# Patient Record
Sex: FEMALE | Race: WHITE | Employment: FULL TIME | URBAN - METROPOLITAN AREA
[De-identification: names, ages, dates, MRNs, and addresses within clinical notes are randomized per-mention and may not be internally consistent; named-entity substitution may affect disease eponyms.]

---

## 2022-03-19 PROBLEM — E66.01 OBESITY, MORBID (HCC): Status: ACTIVE | Noted: 2020-07-13

## 2022-03-19 PROBLEM — Z86.39 HISTORY OF HIGH CHOLESTEROL: Status: ACTIVE | Noted: 2020-07-13

## 2022-10-24 NOTE — PROGRESS NOTES
[address] [facility]. [address], [address] [facility]  [phone]    Patient Name:  Heike Cali  YOB: 1970      Office Visit 10/26/2022    CHIEF COMPLAINT:    Chief Complaint   Patient presents with    Sleep Apnea       HISTORY OF PRESENT ILLNESS:      The patient is seen today in follow-up of obstructive sleep apnea. She has a history of moderate obstructive sleep apnea dating back to a study in 2008. At that time she had an AHI of 20 and a CPAP titration revealed the optimal level of pressure to be 10 cmH2O. She is currently being treated with APAP at 5-15 cmH2O with no EPR. Her download over the past 90 days indicates average usage of 8.5 hours per night with 100% efficacy. Her AHI is excellent at 0.3 and the maximum pressure generated on her machine is 12.1 cm water. Her leak is very modest.  She has received a message that the machine is reaching the end of its battery life and will need to be replaced. She is now here to get a new machine and supplies. The patient is using nasal pillows with the tubing connected to the front of her mask. Since she turns frequently in bed, I showed her the ResMed P 30 I model with the tubing connected at the crown of the head. She would like to try this model as it may may improve her comfort with sleep. The patient remains significantly overweight. Her BMI is over 44. She has tried multiple diets in the past without great efficacy. She was enrolled in a diet through her employer which turned out to be a keto diet which was quite effective for her. I will provide her with our Wheat Belly diet handout which has an excellent discussion of foods helpful for weight loss and those which are not. She might also benefit from looking at cookbooks associated with a side to help guide her food choices long-term. The patient's [place] score today is normal at 2/24 consistent with no hypersomnia.     The patient did have blood work earlier this year documenting a very mildly elevated hemoglobin level possibly consistent with polycythemia. Red blood cell indices were normal.  She also had a normal TSH level.     Sleep Medicine 10/26/2022   Sitting and reading 0   Watching TV 0   Sitting, inactive in a public place (e.g. a theatre or a meeting) 0   As a passenger in a car for an hour without a break 0   Lying down to rest in the afternoon when circumstances permit 2   Sitting and talking to someone 0   Sitting quietly after a lunch without alcohol 0   In a car, while stopped for a few minutes in traffic 0   [place] Sleepiness Score 2            CPAP TITRATION 7/7/08       Ref Range & Units 5/2/22 0759   WBC 3.8 - 11.8 TH/mm3 8.9    RBC 3.63 - 4.92 Mil/mm3 5.24 High     Hemoglobin 10.9 - 14.3 g/dL 14.4 High     Hematocrit 31.2 - 41.9 % 41.9    MCV 75.5 - 95.3 fL 79.9    MCH 24.7 - 32.8 pg 27.6    MCHC 32.3 - 35.6 g/dL 34.5    RDW 12.3 - 17.7 % 14.4    Platelets 744 - 057 Th/mm3 277         Ref Range & Units 5/2/22 0759   TSH 0.45 - 5.33 uIU/mL 2.20    Resulting Agency  [facility]         Past Medical History:   Diagnosis Date    Depression     Diabetes ([facility] [address]     Hypercholesterolemia     Hypertension     Thyroid disease          [unfilled]      Past Surgical History:   Procedure Laterality Date    RECTOCELE REPAIR         [unfilled]    Social History     Socioeconomic History    Marital status: Single     Spouse name: Not on file    Number of children: Not on file    Years of education: Not on file    Highest education level: Not on file   Occupational History    Not on file   Tobacco Use    Smoking status: Never    Smokeless tobacco: Never   Substance and Sexual Activity    Alcohol use: Not Currently    Drug use: Never    Sexual activity: Not on file   Other Topics Concern    Not on file   Social History Narrative    Not on file     Social Determinants of Health     Financial Resource Strain: Not on file Food Insecurity: Not on file   Transportation Needs: Not on file   Physical Activity: Not on file   Stress: Not on file   Social Connections: Not on file   Intimate Partner Violence: Not on file   Housing Stability: Not on file         No family history on file. No Known Allergies      Current Outpatient Medications   Medication Sig    buPROPion (WELLBUTRIN XL) 300 MG extended release tablet Take 300 mg by mouth daily    vitamin D3 (CHOLECALCIFEROL) 125 MCG (5000 UT) TABS tablet Take by mouth    RYBELSUS 7 MG TABS TAKE 1 TABLET BY MOUTH EVERY DAY    Loratadine (CLARITIN PO) Take 10 mg by mouth    B Complex-C (SUPER B COMPLEX PO) Take by mouth    Multiple Vitamins-Minerals (HAIR/SKIN/NAILS/BIOTIN PO) Take by mouth    turmeric 500 MG CAPS Take by mouth 2 times daily    fluticasone (FLONASE) 50 MCG/ACT nasal spray 2 sprays by Nasal route daily    levothyroxine (SYNTHROID) 75 MCG tablet Take by mouth every morning (before breakfast)    lisinopril (PRINIVIL;ZESTRIL) 40 MG tablet Take 40 mg by mouth daily    metFORMIN (GLUCOPHAGE) 1000 MG tablet Take 1,000 mg by mouth 2 times daily (with meals)     No current facility-administered medications for this visit. REVIEW OF SYSTEMS:   CONSTITUTIONAL:   There is no history of fever, chills, night sweats, weight loss, weight gain, persistent fatigue, or lethargy/hypersomnolence. EYES:   Denies problems with eye pain, erythema, blurred vision, or visual field loss. ENTM:   Denies history of tinnitus, epistaxis, sore throat, hoarseness, or dysphonia. LYMPH:   Denies swollen glands. CARDIAC:   No chest pain, pressure, discomfort, palpitations, orthopnea, murmurs, or edema. GI:   No dysphagia, heartburn reflux, nausea/vomiting, diarrhea, abdominal pain, or bleeding. :   Denies history of dysuria, hematuria, polyuria, or decreased urine output. MS:   No history of myalgias, arthralgias, bone pain, or muscle cramps.    SKIN:   No history of rashes, jaundice, cyanosis, nodules, or ulcers. ENDO:   Negative for heat or cold intolerance. She does have a history of diabetes mellitus. PSYCH:   Negative for anxiety, depression, insomnia, hallucinations. NEURO:   There is no history of AMS, persistent headache, decreased level of consciousness, seizures, or motor or sensory deficits. PHYSICAL EXAM:    Vitals:    10/26/22 0843   BP: 130/80   Pulse: 80   Resp: 16   SpO2: 98%   BMI                    44.2     GENERAL APPEARANCE:   The patient is overweight and in no respiratory distress. HEENT:   PERRL. Conjunctivae unremarkable. Nasal mucosa is without epistaxis, exudate, or polyps. Gums and dentition are unremarkable. There is moderate oropharyngeal narrowing. NECK/LYMPHATIC:   Symmetrical with no elevation of jugular venous pulsation. Trachea midline. No thyroid enlargement. No cervical adenopathy. LUNGS:   Normal respiratory effort with symmetrical lung expansion. Breath sounds are mildly decreased but clear bilaterally. HEART:   There is a regular rate and rhythm. No murmur, rub, or gallop. There is no edema in the lower extremities. ABDOMEN:   Soft and non-tender. Bowel sounds are normal.     SKIN:   There are no rashes, cyanosis, jaundice, or ecchymosis present. EXTREMITIES:   The extremities are unremarkable without clubbing, cyanosis, joint inflammation, degenerative, or ischemic change. MUSCULOSKELETAL:   There is no abnormal tone, muscle atrophy, or abnormal movement present. NEURO:   The patient is alert and oriented to person, place, and time. Memory appears intact and mood is normal.  No gross sensorimotor deficits are present. ASSESSMENT:  (Medical Decision Making)      ICD-10-CM    1. RIMMA (obstructive sleep apnea)  G47.33 The patient sleep apnea is very well controlled at this time on APAP at 5-15 cm water. No adjustment in her pressure is needed.   She will need a new machine as hers is reaching the end of its battery life. I will change her to a ResMed P 30 I mask since she turns frequently in bed as this may help with comfort. She is clearly benefiting from therapy. 2. Obesity, morbid ([address]  E66.01 This is an ongoing problem. She admits to being a stress eater. She has not done well with keto diets in the past.  I will provide her with our Wheat Belly diet handout which is an excellent low glycemic index diet which may be more beneficial for her. 3. Hypothyroidism, unspecified type  E03.9 The patient's recent TSH was normal.              PLAN:    Continue APAP at 5-15 cmH2O. I will generate a new start order with [facility] and change her to a ResMed P 30 I mask. The patient is inquiring about a travel CPAP machine. I indicated I could order it for her but she would like to hold off on getting a new machine at this time. She will call if he wants us to put in that order. Weight loss through caloric restriction and increase physical activity as able. Follow-up will be in about 4 to 5 months. Orders Placed This Encounter   Procedures    DME - [address] [facility]  Phone: [address]  [employer] [facility] [address]  Dept: [phone]      Patient Name: Carlos San  : 1970  Gender: female  Address: [place] [address] [address] [address]  Patient phone number: [phone] (home)       Primary Insurance: Payor: Hyedi Medeiros / Plan: Wendy Rosenberg / Product Type: *No Product type* /   Subscriber ID: [id number] - ([facility])      [facility] Order  Order Details     DME Location: [facility]   Order Date: 10/26/2022   The primary encounter diagnosis was RIMMA (obstructive sleep apnea). Diagnoses of Obesity, morbid ([address] and Hypothyroidism, unspecified type were also pertinent to this visit.           (  X   )New Set-Up      machine   (     )[id number] CPAP Unit  (  x   ) Auto CPAP Unit  (     ) BiLevel Unit  ( ) Auto BiLevel Unit  (     ) ASV   (     ) Bilevel ST    (     ) Oxygen Concentrator         Length of need: 12 months    Pressure: 5-15 cmH20  EPR: 0     Starting Ramp Pressure:  5 cm H20  Ramp Time: min N/A    Patient had a diagnostic Apnea Hypopnea Index (AHI) of : 20    *SUPPLIES* Replace all as needed, or per coverage guidelines     Masks Type:    (     ) -Full Face Mask (1 per 3 mon)  (     ) -Full Mask (1 per month) Interface/Cushion      (  x   ) -Nasal Mask (1 per 3 mon) <<< Please size and supply Resmed P30i >>>  (     ) - Nasal Mask (1 per month) Interface/Cushion  (  x   ) -Pillow (2 per mon)  (     ) -Lfuembbzx (1 per 6 mon)      _________________________________________________________________          Other Supplies:    (  X   )-Aubjaltm (1 per 6 mon)  ( X    )-Drfbyg Tubing (1 per 3 mon)  (  X   )- Disposable Filter (2 per mon)  (   X  )-Mggxao Humidifier (1 per year)     (  x   )-Kqhomfcja (sometimes used with Full Face Mask) (1 per 6 mos)  (     )-Tubing-without heat (1 per 3 mos)  ( X   )-Non-Disposable Filter (1 per 6 mos)  (   x  )-Water Chamber (1 per 6 mos)  (     )-Humidifier non-heated (1 per 5 yrs)      Signed Date: 10/26/2022  Electronically Signed By: Yesica Oglesby MD        No orders of the defined types were placed in this encounter. Yesica Oglesby MD  Electronically signed    Over 50% of today's office visit was spent in face to face time reviewing test results, prognosis, importance of compliance, education about disease process, benefits of medications, instructions for management of acute flare-ups, and follow up plans. Total face to face time spent with the patient and charting was 40 minutes. Dictated using voice recognition software.   Proof read but unrecognized errors may exist.

## 2022-10-26 ENCOUNTER — OFFICE VISIT (OUTPATIENT)
Dept: SLEEP MEDICINE | Age: 52
End: 2022-10-26
Payer: COMMERCIAL

## 2022-10-26 VITALS
BODY MASS INDEX: 43.91 KG/M2 | OXYGEN SATURATION: 98 % | HEART RATE: 80 BPM | SYSTOLIC BLOOD PRESSURE: 130 MMHG | DIASTOLIC BLOOD PRESSURE: 80 MMHG | WEIGHT: 257.2 LBS | RESPIRATION RATE: 16 BRPM | HEIGHT: 64 IN

## 2022-10-26 DIAGNOSIS — E66.01 OBESITY, MORBID (HCC): ICD-10-CM

## 2022-10-26 DIAGNOSIS — E03.9 HYPOTHYROIDISM, UNSPECIFIED TYPE: ICD-10-CM

## 2022-10-26 DIAGNOSIS — G47.33 OSA (OBSTRUCTIVE SLEEP APNEA): Primary | ICD-10-CM

## 2022-10-26 PROBLEM — E11.9 TYPE 2 DIABETES MELLITUS (HCC): Status: ACTIVE | Noted: 2022-10-26

## 2022-10-26 PROCEDURE — 99204 OFFICE O/P NEW MOD 45 MIN: CPT | Performed by: INTERNAL MEDICINE

## 2022-10-26 RX ORDER — ORAL SEMAGLUTIDE 7 MG/1
TABLET ORAL
COMMUNITY
Start: 2022-10-05

## 2022-10-26 RX ORDER — VIT C/B6/B5/MAGNESIUM/HERB 173 50-5-6-5MG
CAPSULE ORAL 2 TIMES DAILY
COMMUNITY

## 2022-10-26 RX ORDER — BUPROPION HYDROCHLORIDE 300 MG/1
300 TABLET ORAL DAILY
COMMUNITY
Start: 2020-05-26

## 2022-10-26 ASSESSMENT — SLEEP AND FATIGUE QUESTIONNAIRES
HOW LIKELY ARE YOU TO NOD OFF OR FALL ASLEEP WHEN YOU ARE A PASSENGER IN A CAR FOR AN HOUR WITHOUT A BREAK: 0
HOW LIKELY ARE YOU TO NOD OFF OR FALL ASLEEP IN A CAR, WHILE STOPPED FOR A FEW MINUTES IN TRAFFIC: 0
HOW LIKELY ARE YOU TO NOD OFF OR FALL ASLEEP WHILE LYING DOWN TO REST IN THE AFTERNOON WHEN CIRCUMSTANCES PERMIT: 2
HOW LIKELY ARE YOU TO NOD OFF OR FALL ASLEEP WHILE SITTING AND READING: 0
HOW LIKELY ARE YOU TO NOD OFF OR FALL ASLEEP WHILE SITTING QUIETLY AFTER LUNCH WITHOUT ALCOHOL: 0
HOW LIKELY ARE YOU TO NOD OFF OR FALL ASLEEP WHILE WATCHING TV: 0
HOW LIKELY ARE YOU TO NOD OFF OR FALL ASLEEP WHILE SITTING INACTIVE IN A PUBLIC PLACE: 0
ESS TOTAL SCORE: 2
HOW LIKELY ARE YOU TO NOD OFF OR FALL ASLEEP WHILE SITTING AND TALKING TO SOMEONE: 0

## 2022-10-26 NOTE — PATIENT INSTRUCTIONS
especially those that are non-starchy and low in calories. These include things like cruciferous veggies (broccoli or [place] sprouts, for example), leafy greens, peppers, mushrooms, asparagus, artichoke, etc.   Fresh fruit (but not processed juices), including berries, apples, melon, and citrus fruits like grapefruit or oranges. Some people prefer to eat mostly low-sugar fruits but avoid those higher in sugar like pineapple, papaya, juan or banana. Healthy fats like coconut oil or olive oil, raw nuts and seeds, avocado, coconut milk, olives, cocoa butter, and grass-fed butter or ghee. Grass-fed, humanely raised meat and eggs, plus wild-caught fish. Full-fat cheeses (ideally made from raw, organic milk). Fermented foods like unsweetened kefir or yogurt, pickled or cultured vegetables, and in moderation tofu, tempeh, miso and natto. If theyre well-tolerated, unprocessed grains in moderation, including quinoa, millet, buckwheat (not actually a type of wheat), brown rice and amaranth. Worst Wheat Belly Foods to Avoid:    Eating a wheat belly diet means avoiding anything made with the grains wheat, barley, rye, spelt or certain oats. Additionally, Ojus Batter recommends avoiding added sugar, condiments that include synthetic or chemically altered ingredients, sugary drinks and other processed foods as much as possible. Below are the main foods to exclude from your diet if you choose to try following this dietary plan:  Grain-based desserts, including both packaged or homemade cakes, cookies, donuts, pies, crisps, cobblers, and granola bars   Breads, especially those made with refined wheat flour. Even many gluten-free breads or packaged products should not contribute many calories to your diet.  While products made from grains other than wheat (like corn or rice) might be free of gluten, theyre still usually not very nutrient-dense and are inferior to eating whole, sprouted ancient grains like oats, quinoa, wild rice or teff, for example. Plus, modern food-processing techniques usually contaminate these foods with gluten since theyre processed using the same equipment that wheat is. Most cereals   Pizza   Pasta and noodles   Chips and crackers   Wheat tortillas, wraps, burritos and tacos   Fast food   Take-out, including most [place] or [place] dishes, burgers and deli sandwiches   Breaded proteins like chicken cutlets, processed meats, hot dogs and frozen veggie burgers   Added sugar, including high fructose corn syrup, sucrose, dried fruit, juices and sugary beverage   Processed rice and potato products   Trans fats, fried foods and cured meats  Tips for Giving Up Wheat and Processed Grains:  When grocery shopping, check ingredients carefully and look for products made without wheat, rye and barley. This might mean choosing certified gluten-free items in some cases, although even these can be highly processed. The most substantial sources of wheat in your diet are likely bread or baked products made with wheat flour (like pizza, pasta at restaurants, bread, etc.), so unless specifically noted that these are grain- or gluten-free, assume they contain wheat. If you are going to buy bread, look for sourdough or sprouted grain breads (like Yasir bread), which are usually better tolerated than ordinary wheat-flour breads. When it comes to baking or using flour in recipes, try some of these naturally gluten-free flour alternatives over wheat flour: brown rice, quinoa, chickpea, almond and coconut flour. Remember that wheat is hiding in many condiments, sauces, dressings, etc. Avoid any that contain flour or added sugar, sticking with basic condiments or flavor enhancers like vinegar, herbs, spices and real bone broth. Many types of alcohol, including beer, also contain wheat.  Hard liquor and wine are better options, however watch the amount you consume and what you mix these

## 2022-11-02 ENCOUNTER — TELEPHONE (OUTPATIENT)
Dept: SLEEP MEDICINE | Age: 52
End: 2022-11-02

## 2022-11-02 NOTE — TELEPHONE ENCOUNTER
Patient was in last week. Does not want to use [facility]. Patient wants order sent to [facility]    [phone]

## 2024-03-28 ENCOUNTER — APPOINTMENT (RX ONLY)
Age: 54
Setting detail: DERMATOLOGY
End: 2024-03-28

## 2024-03-28 VITALS — DIASTOLIC BLOOD PRESSURE: 88 MMHG | SYSTOLIC BLOOD PRESSURE: 138 MMHG

## 2024-03-28 DIAGNOSIS — L64.8 OTHER ANDROGENIC ALOPECIA: ICD-10-CM

## 2024-03-28 DIAGNOSIS — L81.4 OTHER MELANIN HYPERPIGMENTATION: ICD-10-CM

## 2024-03-28 PROCEDURE — 99204 OFFICE O/P NEW MOD 45 MIN: CPT

## 2024-03-28 PROCEDURE — ? PHOTO-DOCUMENTATION

## 2024-03-28 PROCEDURE — ? PRESCRIPTION MEDICATION MANAGEMENT

## 2024-03-28 PROCEDURE — ? COUNSELING

## 2024-03-28 ASSESSMENT — LOCATION DETAILED DESCRIPTION DERM
LOCATION DETAILED: RIGHT SUPERIOR PARIETAL SCALP
LOCATION DETAILED: RIGHT CENTRAL MALAR CHEEK
LOCATION DETAILED: LEFT INFERIOR CENTRAL MALAR CHEEK

## 2024-03-28 ASSESSMENT — LOCATION ZONE DERM
LOCATION ZONE: FACE
LOCATION ZONE: SCALP

## 2024-03-28 ASSESSMENT — LOCATION SIMPLE DESCRIPTION DERM
LOCATION SIMPLE: RIGHT CHEEK
LOCATION SIMPLE: LEFT CHEEK
LOCATION SIMPLE: SCALP

## 2024-03-28 NOTE — PROCEDURE: PRESCRIPTION MEDICATION MANAGEMENT
Continue Regimen: - Minoxidil 2.5 mg- take one tablet daily (previously prescribed)
Detail Level: Zone
Render In Strict Bullet Format?: No

## 2024-03-28 NOTE — HPI: OTHER
Condition:: skin lesions
Please Describe Your Condition:: is a new patient who is being seen for a chief complaint of skin lesions. located on the face for over 8 years, have not changed in size, but are darker in pigmentation. She believes the brown spots are hereditary.

## 2024-06-19 ENCOUNTER — RX ONLY (OUTPATIENT)
Age: 54
Setting detail: RX ONLY
End: 2024-06-19

## 2024-06-19 RX ORDER — MINOXIDIL 2.5 MG/1
TABLET ORAL
Qty: 90 | Refills: 1 | Status: ERX | COMMUNITY
Start: 2024-06-19

## 2025-07-01 ENCOUNTER — TELEPHONE (OUTPATIENT)
Dept: INTERNAL MEDICINE CLINIC | Facility: CLINIC | Age: 55
End: 2025-07-01

## 2025-07-22 SDOH — HEALTH STABILITY: PHYSICAL HEALTH: ON AVERAGE, HOW MANY DAYS PER WEEK DO YOU ENGAGE IN MODERATE TO STRENUOUS EXERCISE (LIKE A BRISK WALK)?: 4 DAYS

## 2025-07-22 SDOH — HEALTH STABILITY: PHYSICAL HEALTH: ON AVERAGE, HOW MANY MINUTES DO YOU ENGAGE IN EXERCISE AT THIS LEVEL?: 30 MIN

## 2025-07-23 ENCOUNTER — OFFICE VISIT (OUTPATIENT)
Dept: INTERNAL MEDICINE CLINIC | Facility: CLINIC | Age: 55
End: 2025-07-23
Payer: COMMERCIAL

## 2025-07-23 VITALS
HEIGHT: 64 IN | WEIGHT: 228 LBS | SYSTOLIC BLOOD PRESSURE: 128 MMHG | BODY MASS INDEX: 38.93 KG/M2 | DIASTOLIC BLOOD PRESSURE: 80 MMHG | HEART RATE: 77 BPM | TEMPERATURE: 97.1 F | OXYGEN SATURATION: 96 %

## 2025-07-23 DIAGNOSIS — F33.41 RECURRENT MAJOR DEPRESSIVE DISORDER, IN PARTIAL REMISSION: ICD-10-CM

## 2025-07-23 DIAGNOSIS — Z12.31 VISIT FOR SCREENING MAMMOGRAM: ICD-10-CM

## 2025-07-23 DIAGNOSIS — Z23 NEED FOR SHINGLES VACCINE: ICD-10-CM

## 2025-07-23 DIAGNOSIS — E66.01 OBESITY, MORBID (HCC): ICD-10-CM

## 2025-07-23 DIAGNOSIS — I10 BENIGN HYPERTENSION: ICD-10-CM

## 2025-07-23 DIAGNOSIS — E03.9 ACQUIRED HYPOTHYROIDISM: ICD-10-CM

## 2025-07-23 DIAGNOSIS — E55.9 VITAMIN D DEFICIENCY: ICD-10-CM

## 2025-07-23 DIAGNOSIS — E78.5 HYPERLIPIDEMIA ASSOCIATED WITH TYPE 2 DIABETES MELLITUS (HCC): ICD-10-CM

## 2025-07-23 DIAGNOSIS — Z23 NEED FOR TDAP VACCINATION: ICD-10-CM

## 2025-07-23 DIAGNOSIS — E11.65 TYPE 2 DIABETES MELLITUS WITH HYPERGLYCEMIA, WITHOUT LONG-TERM CURRENT USE OF INSULIN (HCC): ICD-10-CM

## 2025-07-23 DIAGNOSIS — E53.8 B12 DEFICIENCY: ICD-10-CM

## 2025-07-23 DIAGNOSIS — E11.69 HYPERLIPIDEMIA ASSOCIATED WITH TYPE 2 DIABETES MELLITUS (HCC): ICD-10-CM

## 2025-07-23 DIAGNOSIS — Z76.89 ENCOUNTER TO ESTABLISH CARE WITH NEW DOCTOR: Primary | ICD-10-CM

## 2025-07-23 PROBLEM — E78.2 MIXED HYPERLIPIDEMIA: Status: RESOLVED | Noted: 2020-07-13 | Resolved: 2025-07-23

## 2025-07-23 PROBLEM — E78.2 MIXED HYPERLIPIDEMIA: Status: ACTIVE | Noted: 2020-07-13

## 2025-07-23 PROBLEM — M22.42 CHONDROMALACIA OF LEFT PATELLA: Status: ACTIVE | Noted: 2021-06-30

## 2025-07-23 PROBLEM — F32.A DEPRESSION: Status: ACTIVE | Noted: 2021-05-03

## 2025-07-23 PROBLEM — Z78.0 MENOPAUSE PRESENT: Status: ACTIVE | Noted: 2024-05-29

## 2025-07-23 PROBLEM — E11.9 TYPE 2 DIABETES MELLITUS (HCC): Status: RESOLVED | Noted: 2022-10-26 | Resolved: 2025-07-23

## 2025-07-23 LAB — HBA1C MFR BLD: 6.5 %

## 2025-07-23 PROCEDURE — 90715 TDAP VACCINE 7 YRS/> IM: CPT | Performed by: INTERNAL MEDICINE

## 2025-07-23 PROCEDURE — 3074F SYST BP LT 130 MM HG: CPT | Performed by: INTERNAL MEDICINE

## 2025-07-23 PROCEDURE — 3079F DIAST BP 80-89 MM HG: CPT | Performed by: INTERNAL MEDICINE

## 2025-07-23 PROCEDURE — 3044F HG A1C LEVEL LT 7.0%: CPT | Performed by: INTERNAL MEDICINE

## 2025-07-23 PROCEDURE — 90471 IMMUNIZATION ADMIN: CPT | Performed by: INTERNAL MEDICINE

## 2025-07-23 PROCEDURE — 99204 OFFICE O/P NEW MOD 45 MIN: CPT | Performed by: INTERNAL MEDICINE

## 2025-07-23 PROCEDURE — 83036 HEMOGLOBIN GLYCOSYLATED A1C: CPT | Performed by: INTERNAL MEDICINE

## 2025-07-23 RX ORDER — BUPROPION HYDROCHLORIDE 300 MG/1
300 TABLET ORAL DAILY
Qty: 90 TABLET | Refills: 1 | Status: SHIPPED | OUTPATIENT
Start: 2025-07-23

## 2025-07-23 RX ORDER — BUPROPION HYDROCHLORIDE 150 MG/1
150 TABLET ORAL EVERY MORNING
COMMUNITY
End: 2025-07-23 | Stop reason: SDUPTHER

## 2025-07-23 RX ORDER — MINOXIDIL 2.5 MG/1
2.5 TABLET ORAL DAILY
COMMUNITY

## 2025-07-23 RX ORDER — LEVOTHYROXINE SODIUM 88 MCG
88 TABLET ORAL DAILY
COMMUNITY
Start: 2025-06-25 | End: 2025-07-23 | Stop reason: SDUPTHER

## 2025-07-23 RX ORDER — ZOSTER VACCINE RECOMBINANT, ADJUVANTED 50 MCG/0.5
0.5 KIT INTRAMUSCULAR SEE ADMIN INSTRUCTIONS
Qty: 0.5 ML | Refills: 0 | Status: SHIPPED | OUTPATIENT
Start: 2025-07-23 | End: 2026-01-19

## 2025-07-23 RX ORDER — TIRZEPATIDE 7.5 MG/.5ML
7.5 INJECTION, SOLUTION SUBCUTANEOUS
Qty: 6 ML | Refills: 1 | Status: SHIPPED | OUTPATIENT
Start: 2025-07-23

## 2025-07-23 RX ORDER — LISINOPRIL 40 MG/1
40 TABLET ORAL DAILY
Qty: 90 TABLET | Refills: 1 | Status: SHIPPED | OUTPATIENT
Start: 2025-07-23

## 2025-07-23 RX ORDER — LEVOTHYROXINE SODIUM 88 MCG
88 TABLET ORAL DAILY
Qty: 90 TABLET | Refills: 1 | Status: SHIPPED | OUTPATIENT
Start: 2025-07-23

## 2025-07-23 RX ORDER — ROSUVASTATIN CALCIUM 10 MG/1
10 TABLET, COATED ORAL NIGHTLY
Qty: 90 TABLET | Refills: 1 | Status: SHIPPED | OUTPATIENT
Start: 2025-07-23

## 2025-07-23 RX ORDER — TIRZEPATIDE 7.5 MG/.5ML
7.5 INJECTION, SOLUTION SUBCUTANEOUS
COMMUNITY
Start: 2024-01-01 | End: 2025-07-23 | Stop reason: SDUPTHER

## 2025-07-23 RX ORDER — ROSUVASTATIN CALCIUM 10 MG/1
10 TABLET, COATED ORAL NIGHTLY
COMMUNITY
End: 2025-07-23 | Stop reason: SDUPTHER

## 2025-07-23 RX ORDER — FINASTERIDE 5 MG/1
5 TABLET, FILM COATED ORAL DAILY
COMMUNITY
Start: 2025-06-12

## 2025-07-23 RX ORDER — BUPROPION HYDROCHLORIDE 150 MG/1
150 TABLET ORAL EVERY MORNING
Qty: 90 TABLET | Refills: 3 | Status: SHIPPED | OUTPATIENT
Start: 2025-07-23

## 2025-07-23 SDOH — ECONOMIC STABILITY: FOOD INSECURITY: WITHIN THE PAST 12 MONTHS, YOU WORRIED THAT YOUR FOOD WOULD RUN OUT BEFORE YOU GOT MONEY TO BUY MORE.: NEVER TRUE

## 2025-07-23 SDOH — ECONOMIC STABILITY: FOOD INSECURITY: WITHIN THE PAST 12 MONTHS, THE FOOD YOU BOUGHT JUST DIDN'T LAST AND YOU DIDN'T HAVE MONEY TO GET MORE.: NEVER TRUE

## 2025-07-23 ASSESSMENT — PATIENT HEALTH QUESTIONNAIRE - PHQ9
1. LITTLE INTEREST OR PLEASURE IN DOING THINGS: NOT AT ALL
SUM OF ALL RESPONSES TO PHQ QUESTIONS 1-9: 0
SUM OF ALL RESPONSES TO PHQ QUESTIONS 1-9: 0
2. FEELING DOWN, DEPRESSED OR HOPELESS: NOT AT ALL
SUM OF ALL RESPONSES TO PHQ QUESTIONS 1-9: 0
SUM OF ALL RESPONSES TO PHQ QUESTIONS 1-9: 0

## 2025-07-23 ASSESSMENT — ENCOUNTER SYMPTOMS
SHORTNESS OF BREATH: 0
DIARRHEA: 0
BLOOD IN STOOL: 0
VOMITING: 0
COUGH: 0
SINUS PRESSURE: 0
RHINORRHEA: 0
NAUSEA: 0
CHEST TIGHTNESS: 0
ABDOMINAL PAIN: 0

## 2025-07-23 NOTE — PROGRESS NOTES
[facility]      2025    Patient Name: Kathia Dominguez  :  1970    Subjective:    Chief Complaint:  Chief Complaint   Patient presents with    New Patient         HPI Here to establish care, was previously seeing Dr. Rodriguez at [facility]; she most recently saw [facility] in [place] as well;   She has RIMAM, using CPAP at night;   She has lost 60 lbs, trying to follow a healthy diet and to be more active, taking Mounjaro as prescribed;   She has depression, has taken antidepressant medication for many years; she has tried stopping medication in the past, but does not feel well; she is taking Wellbutrin as prescribed and feeling well;   She had negative Cologuard in , would like to wait until next year for colonoscopy referral;   HTN:  Patient compliant with taking blood pressure medications: Yes  Discussed importance of following low sodium DASH diet, increasing physical activity, taking medications as ordered, decreasing alcohol intake, keeping f/u visits to recheck blood pressure, monitoring blood pressure at home and keeping a log, with goal blood pressure <140/90.  Home bp readings are: good  Diabetes:  Blood sugar readings: 130-140s, 109, 110  Patient compliant with low carbohydrate diet, with occasional dietary indiscretions.  Patient is sedentary and does not exercise.  Regular exercise recommended.  Patient advised on foot care and regular foot exam.  Last eye exam: 2025, [facility]  Last HgbA1c: No results found for: \"LABA1C\"  No results found for: \"EAG\"       Past Medical History:   Diagnosis Date    Depression     Diabetes (HCC)     History of mammogram 2024    History of Papanicolaou smear of cervix 2025    [facility]    Hypercholesterolemia     Hypertension     Thyroid disease        Past Surgical History:   Procedure Laterality Date    OTHER SURGICAL HISTORY      Cologuard negative    RECTOCELE REPAIR         Family History   Problem Relation Age of Onset    Hypertension